# Patient Record
Sex: FEMALE | Race: WHITE | NOT HISPANIC OR LATINO | Employment: STUDENT | URBAN - METROPOLITAN AREA
[De-identification: names, ages, dates, MRNs, and addresses within clinical notes are randomized per-mention and may not be internally consistent; named-entity substitution may affect disease eponyms.]

---
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
## 2024-05-27 ENCOUNTER — OFFICE VISIT (OUTPATIENT)
Dept: URGENT CARE | Facility: CLINIC | Age: 19
End: 2024-05-27
Payer: COMMERCIAL

## 2024-05-27 VITALS
SYSTOLIC BLOOD PRESSURE: 111 MMHG | BODY MASS INDEX: 18.49 KG/M2 | HEIGHT: 65 IN | OXYGEN SATURATION: 98 % | RESPIRATION RATE: 18 BRPM | HEART RATE: 74 BPM | TEMPERATURE: 99 F | DIASTOLIC BLOOD PRESSURE: 66 MMHG | WEIGHT: 111 LBS

## 2024-05-27 DIAGNOSIS — R51.9 NONINTRACTABLE HEADACHE, UNSPECIFIED CHRONICITY PATTERN, UNSPECIFIED HEADACHE TYPE: Primary | ICD-10-CM

## 2024-05-27 DIAGNOSIS — R11.0 NAUSEA: ICD-10-CM

## 2024-05-27 PROCEDURE — 99213 OFFICE O/P EST LOW 20 MIN: CPT | Mod: S$GLB,,, | Performed by: PHYSICIAN ASSISTANT

## 2024-05-27 RX ORDER — ONDANSETRON 4 MG/1
4 TABLET, ORALLY DISINTEGRATING ORAL EVERY 8 HOURS PRN
Qty: 30 TABLET | Refills: 0 | Status: SHIPPED | OUTPATIENT
Start: 2024-05-27

## 2024-05-27 RX ORDER — KETOROLAC TROMETHAMINE 30 MG/ML
30 INJECTION, SOLUTION INTRAMUSCULAR; INTRAVENOUS
Status: DISCONTINUED | OUTPATIENT
Start: 2024-05-27 | End: 2024-05-27

## 2024-05-27 NOTE — PROGRESS NOTES
"Subjective:      Patient ID: Brina Gonzales is a 19 y.o. female.    Vitals:  height is 5' 5" (1.651 m) and weight is 50.3 kg (111 lb). Her oral temperature is 99.4 °F (37.4 °C). Her blood pressure is 111/66 and her pulse is 74. Her respiration is 18 and oxygen saturation is 98%.     Chief Complaint: Headache    19 yr old female patient presents to urgent care today with complaints of migraine headache x 7 days. Patient reports hx of migraines. Patient states that she is feeling sharp stabbing pain behind left eye and having pressure all over her head. Patient took Ubrevley (trial dose from her PCP) 1/100 mg tablet only and she reports migraine went away for a day but then returned. She has been taking advil, excedrin migraine OTC with only mild relief.     Headache   This is a new problem. The current episode started 1 to 4 weeks ago. The problem occurs constantly. The problem has been gradually worsening. The pain is located in the Bilateral region. The pain does not radiate. The quality of the pain is described as aching, stabbing and throbbing. The pain is at a severity of 3/10. The pain is mild. Associated symptoms include nausea. Pertinent negatives include no abdominal pain, abnormal behavior, anorexia, back pain, blurred vision, coughing, dizziness, drainage, ear pain, eye pain, eye redness, eye watering, facial sweating, fever, hearing loss, insomnia, loss of balance, muscle aches, neck pain, numbness, phonophobia, photophobia, rhinorrhea, scalp tenderness, seizures, sinus pressure, sore throat, swollen glands, tingling, tinnitus, visual change, vomiting, weakness or weight loss. The symptoms are aggravated by bright light. She has tried NSAIDs and Excedrin for the symptoms. The treatment provided mild relief. Her past medical history is significant for migraine headaches.       Constitution: Negative for chills, sweating, fatigue and fever.   HENT:  Negative for ear pain, tinnitus, hearing loss, drooling, " congestion, sinus pressure, sore throat, trouble swallowing and voice change.    Neck: Negative for neck pain, neck stiffness, painful lymph nodes and neck swelling.   Cardiovascular:  Negative for chest pain, leg swelling, palpitations, sob on exertion and passing out.   Eyes:  Negative for eye pain, eye redness, photophobia, double vision, blurred vision and eyelid swelling.   Respiratory:  Negative for chest tightness, cough, sputum production, bloody sputum, shortness of breath, stridor and wheezing.    Gastrointestinal:  Positive for nausea. Negative for abdominal pain, abdominal bloating, vomiting, constipation, diarrhea and heartburn.   Musculoskeletal:  Negative for joint pain, joint swelling, abnormal ROM of joint, back pain, muscle cramps and muscle ache.   Skin:  Negative for rash and hives.   Allergic/Immunologic: Negative for seasonal allergies, food allergies, hives, itching and sneezing.   Neurological:  Positive for headaches and history of migraines. Negative for dizziness, light-headedness, passing out, facial drooping, speech difficulty, loss of balance, altered mental status, loss of consciousness, numbness and seizures.   Hematologic/Lymphatic: Negative for swollen lymph nodes.   Psychiatric/Behavioral:  Negative for altered mental status and nervous/anxious. The patient is not nervous/anxious and does not have insomnia.       Objective:     Physical Exam   Constitutional: She is oriented to person, place, and time. She appears well-developed.  Non-toxic appearance. She does not appear ill. No distress.   HENT:   Head: Normocephalic and atraumatic.   Ears:   Right Ear: Hearing, tympanic membrane, external ear and ear canal normal.   Left Ear: Hearing, tympanic membrane, external ear and ear canal normal.   Nose: Nose normal. No mucosal edema, rhinorrhea or nasal deformity. No epistaxis. Right sinus exhibits no maxillary sinus tenderness and no frontal sinus tenderness. Left sinus exhibits no  maxillary sinus tenderness and no frontal sinus tenderness.   Mouth/Throat: Uvula is midline, oropharynx is clear and moist and mucous membranes are normal. No trismus in the jaw. Normal dentition. No uvula swelling. No posterior oropharyngeal erythema.   Eyes: Conjunctivae, EOM and lids are normal. Pupils are equal, round, and reactive to light. No scleral icterus.   Neck: Trachea normal and phonation normal. Neck supple. No neck rigidity present.   Cardiovascular: Normal rate, regular rhythm, normal heart sounds and normal pulses.   Pulmonary/Chest: Effort normal and breath sounds normal. No respiratory distress.   Abdominal: Normal appearance and bowel sounds are normal. She exhibits no distension. Soft. There is no abdominal tenderness. There is no rebound and no guarding.   Musculoskeletal: Normal range of motion.         General: No deformity. Normal range of motion.   Neurological: She is alert and oriented to person, place, and time. No cranial nerve deficit. She exhibits normal muscle tone. Coordination normal.   Skin: Skin is warm, dry, intact, not diaphoretic and not pale.   Psychiatric: Her speech is normal and behavior is normal. Judgment and thought content normal.   Nursing note and vitals reviewed.      Assessment:     1. Nonintractable headache, unspecified chronicity pattern, unspecified headache type    2. Nausea        Plan:   Discussed the limitations in the urgent care setting with patient. Offered toradol IM, but patient deferred at this time. Advised close follow-up with PCP and/or Specialist for further evaluation as needed. Strict ER precautions given to patient as well. Patient aware, verbalized understanding and agreed with plan of care.    Nonintractable headache, unspecified chronicity pattern, unspecified headache type  -     Ambulatory referral/consult to Neurology    Nausea  -     ondansetron (ZOFRAN-ODT) 4 MG TbDL; Take 1 tablet (4 mg total) by mouth every 8 (eight) hours as needed  "(for nausea).  Dispense: 30 tablet; Refill: 0    Other orders  -     Discontinue: ketorolac injection 30 mg      Patient Instructions   If you were prescribed a narcotic or controlled medication, do not drive or operate heavy equipment or machinery while taking these medications.  You must understand that you've received an Urgent Care treatment only and that you may be released before all your medical problems are known or treated. You, the patient, will arrange for follow up care as instructed.  Follow up with your PCP or specialty clinic as directed if not improved or as needed. You can call [phone] to schedule an appointment with the appropriate provider.  If your condition worsens we recommend that you receive another evaluation at the Emergency Department for any concerns or worsening of condition.  Patient aware and verbalized understanding.    Encouraged patient to get lots of rest and hydrate with plenty of fluids.  Recommended very bland diet for the next 24-48 hours.   Avoid spicy foods and fast food/greasy food until symptoms have resolved.  Advised patient to apply ice pack on the forehead, base of the skull and/or over the eye to help with pain relief.  Advised patient to avoid "screen time" for the next 24 hours because rest is most important without light or sound.  When you get home, advised patient to get a dark and quiet room and rest. Bright glaring light and loud noise can worsen headaches.  OTC NSAIDs every 6 hours as needed for headache, fever or pain.  Keep headache diary as discussed to identify possible triggers.  Zofran RX as prescribed for nausea/vomiting as needed.  Advised patient to follow-up appointment with PCP in the next 24 hours for further evaluation as needed.  Advised patient to go to the nearest ER for further evaluation if any of the following occurs:   - Worsening of your head pain or no improvement within 12 hours  - Repeated vomiting (unable to keep liquids down)  - " Fever over 100.0ºF (37.8ºC)  - Stiff neck  - Extreme drowsiness, confusion or fainting  - Dizziness, vertigo (dizziness with spinning sensation)  - Weakness of an arm or leg or one side of the face  - Difficulty with speech or vision  Patient aware, verbalized understanding and agreed with patient's plan of care.

## 2024-05-27 NOTE — PATIENT INSTRUCTIONS
"If you were prescribed a narcotic or controlled medication, do not drive or operate heavy equipment or machinery while taking these medications.  You must understand that you've received an Urgent Care treatment only and that you may be released before all your medical problems are known or treated. You, the patient, will arrange for follow up care as instructed.  Follow up with your PCP or specialty clinic as directed if not improved or as needed. You can call [phone] to schedule an appointment with the appropriate provider.  If your condition worsens we recommend that you receive another evaluation at the Emergency Department for any concerns or worsening of condition.  Patient aware and verbalized understanding.    Encouraged patient to get lots of rest and hydrate with plenty of fluids.  Recommended very bland diet for the next 24-48 hours.   Avoid spicy foods and fast food/greasy food until symptoms have resolved.  Advised patient to apply ice pack on the forehead, base of the skull and/or over the eye to help with pain relief.  Advised patient to avoid "screen time" for the next 24 hours because rest is most important without light or sound.  When you get home, advised patient to get a dark and quiet room and rest. Bright glaring light and loud noise can worsen headaches.  OTC NSAIDs every 6 hours as needed for headache, fever or pain.  Keep headache diary as discussed to identify possible triggers.  Zofran RX as prescribed for nausea/vomiting as needed.  Advised patient to follow-up appointment with PCP in the next 24 hours for further evaluation as needed.  Advised patient to go to the nearest ER for further evaluation if any of the following occurs:   - Worsening of your head pain or no improvement within 12 hours  - Repeated vomiting (unable to keep liquids down)  - Fever over 100.0ºF (37.8ºC)  - Stiff neck  - Extreme drowsiness, confusion or fainting  - Dizziness, vertigo (dizziness with spinning " sensation)  - Weakness of an arm or leg or one side of the face  - Difficulty with speech or vision  Patient aware, verbalized understanding and agreed with patient's plan of care.

## 2024-05-28 ENCOUNTER — TELEPHONE (OUTPATIENT)
Dept: NEUROLOGY | Facility: CLINIC | Age: 19
End: 2024-05-28
Payer: COMMERCIAL

## 2024-05-28 NOTE — TELEPHONE ENCOUNTER
Spoke to the pt's mom, appt scheduled on 5/30/24 at 1000 with Richard Gillespie for headaches.  Date, time and location discussed.